# Patient Record
Sex: FEMALE | Race: WHITE | NOT HISPANIC OR LATINO | Employment: OTHER | URBAN - METROPOLITAN AREA
[De-identification: names, ages, dates, MRNs, and addresses within clinical notes are randomized per-mention and may not be internally consistent; named-entity substitution may affect disease eponyms.]

---

## 2017-05-17 ENCOUNTER — HOSPITAL ENCOUNTER (OUTPATIENT)
Dept: RADIOLOGY | Age: 67
Discharge: HOME | End: 2017-05-17
Payer: COMMERCIAL

## 2017-05-17 DIAGNOSIS — Z12.31 ENCOUNTER FOR SCREENING MAMMOGRAM FOR MALIGNANT NEOPLASM OF BREAST: ICD-10-CM

## 2017-05-17 PROCEDURE — G0202 SCR MAMMO BI INCL CAD: HCPCS

## 2017-12-06 ENCOUNTER — TRANSCRIBE ORDERS (OUTPATIENT)
Dept: ADMINISTRATIVE | Age: 67
End: 2017-12-06

## 2017-12-06 ENCOUNTER — APPOINTMENT (OUTPATIENT)
Dept: LAB | Age: 67
End: 2017-12-06
Payer: COMMERCIAL

## 2017-12-06 DIAGNOSIS — Z00.00 ROUTINE GENERAL MEDICAL EXAMINATION AT A HEALTH CARE FACILITY: ICD-10-CM

## 2017-12-06 DIAGNOSIS — E78.2 MIXED HYPERLIPIDEMIA: ICD-10-CM

## 2017-12-06 DIAGNOSIS — Z00.00 ROUTINE GENERAL MEDICAL EXAMINATION AT A HEALTH CARE FACILITY: Primary | ICD-10-CM

## 2017-12-06 LAB
ALBUMIN SERPL BCP-MCNC: 3.9 G/DL (ref 3.5–5)
ALP SERPL-CCNC: 95 U/L (ref 46–116)
ALT SERPL W P-5'-P-CCNC: 24 U/L (ref 12–78)
ANION GAP SERPL CALCULATED.3IONS-SCNC: 5 MMOL/L (ref 4–13)
AST SERPL W P-5'-P-CCNC: 18 U/L (ref 5–45)
BASOPHILS # BLD AUTO: 0.02 THOUSANDS/ΜL (ref 0–0.1)
BASOPHILS NFR BLD AUTO: 0 % (ref 0–1)
BILIRUB SERPL-MCNC: 0.48 MG/DL (ref 0.2–1)
BUN SERPL-MCNC: 19 MG/DL (ref 5–25)
CALCIUM SERPL-MCNC: 9 MG/DL (ref 8.3–10.1)
CHLORIDE SERPL-SCNC: 103 MMOL/L (ref 100–108)
CHOLEST SERPL-MCNC: 237 MG/DL (ref 50–200)
CO2 SERPL-SCNC: 32 MMOL/L (ref 21–32)
CREAT SERPL-MCNC: 0.86 MG/DL (ref 0.6–1.3)
EOSINOPHIL # BLD AUTO: 0.12 THOUSAND/ΜL (ref 0–0.61)
EOSINOPHIL NFR BLD AUTO: 2 % (ref 0–6)
ERYTHROCYTE [DISTWIDTH] IN BLOOD BY AUTOMATED COUNT: 14.1 % (ref 11.6–15.1)
GFR SERPL CREATININE-BSD FRML MDRD: 69 ML/MIN/1.73SQ M
GLUCOSE P FAST SERPL-MCNC: 83 MG/DL (ref 65–99)
HCT VFR BLD AUTO: 37.7 % (ref 34.8–46.1)
HDLC SERPL-MCNC: 54 MG/DL (ref 40–60)
HGB BLD-MCNC: 12.7 G/DL (ref 11.5–15.4)
LDLC SERPL CALC-MCNC: 158 MG/DL (ref 0–100)
LYMPHOCYTES # BLD AUTO: 2.35 THOUSANDS/ΜL (ref 0.6–4.47)
LYMPHOCYTES NFR BLD AUTO: 48 % (ref 14–44)
MCH RBC QN AUTO: 29.4 PG (ref 26.8–34.3)
MCHC RBC AUTO-ENTMCNC: 33.7 G/DL (ref 31.4–37.4)
MCV RBC AUTO: 87 FL (ref 82–98)
MONOCYTES # BLD AUTO: 0.58 THOUSAND/ΜL (ref 0.17–1.22)
MONOCYTES NFR BLD AUTO: 12 % (ref 4–12)
NEUTROPHILS # BLD AUTO: 1.92 THOUSANDS/ΜL (ref 1.85–7.62)
NEUTS SEG NFR BLD AUTO: 38 % (ref 43–75)
NRBC BLD AUTO-RTO: 0 /100 WBCS
PLATELET # BLD AUTO: 410 THOUSANDS/UL (ref 149–390)
PMV BLD AUTO: 11.2 FL (ref 8.9–12.7)
POTASSIUM SERPL-SCNC: 4 MMOL/L (ref 3.5–5.3)
PROT SERPL-MCNC: 7.9 G/DL (ref 6.4–8.2)
RBC # BLD AUTO: 4.32 MILLION/UL (ref 3.81–5.12)
SODIUM SERPL-SCNC: 140 MMOL/L (ref 136–145)
TRIGL SERPL-MCNC: 124 MG/DL
WBC # BLD AUTO: 5 THOUSAND/UL (ref 4.31–10.16)

## 2017-12-06 PROCEDURE — 80061 LIPID PANEL: CPT

## 2017-12-06 PROCEDURE — 80053 COMPREHEN METABOLIC PANEL: CPT

## 2017-12-06 PROCEDURE — 36415 COLL VENOUS BLD VENIPUNCTURE: CPT

## 2017-12-06 PROCEDURE — 85025 COMPLETE CBC W/AUTO DIFF WBC: CPT

## 2018-05-18 ENCOUNTER — TRANSCRIBE ORDERS (OUTPATIENT)
Dept: RADIOLOGY | Facility: CLINIC | Age: 68
End: 2018-05-18

## 2018-05-22 ENCOUNTER — HOSPITAL ENCOUNTER (OUTPATIENT)
Dept: RADIOLOGY | Age: 68
Discharge: HOME | End: 2018-05-22
Payer: COMMERCIAL

## 2018-05-22 DIAGNOSIS — Z12.31 ENCOUNTER FOR SCREENING MAMMOGRAM FOR MALIGNANT NEOPLASM OF BREAST: ICD-10-CM

## 2018-05-22 PROCEDURE — 77067 SCR MAMMO BI INCL CAD: CPT

## 2018-05-25 ENCOUNTER — TELEPHONE (OUTPATIENT)
Dept: MAMMOGRAPHY | Facility: CLINIC | Age: 68
End: 2018-05-25

## 2021-02-13 DIAGNOSIS — Z23 ENCOUNTER FOR IMMUNIZATION: ICD-10-CM

## 2021-02-17 ENCOUNTER — IMMUNIZATIONS (OUTPATIENT)
Dept: FAMILY MEDICINE CLINIC | Facility: HOSPITAL | Age: 71
End: 2021-02-17

## 2021-02-17 DIAGNOSIS — Z23 ENCOUNTER FOR IMMUNIZATION: Primary | ICD-10-CM

## 2021-02-17 PROCEDURE — 0001A SARS-COV-2 / COVID-19 MRNA VACCINE (PFIZER-BIONTECH) 30 MCG: CPT

## 2021-02-17 PROCEDURE — 91300 SARS-COV-2 / COVID-19 MRNA VACCINE (PFIZER-BIONTECH) 30 MCG: CPT

## 2021-03-08 ENCOUNTER — IMMUNIZATIONS (OUTPATIENT)
Dept: FAMILY MEDICINE CLINIC | Facility: HOSPITAL | Age: 71
End: 2021-03-08

## 2021-03-08 DIAGNOSIS — Z23 ENCOUNTER FOR IMMUNIZATION: Primary | ICD-10-CM

## 2021-03-08 PROCEDURE — 91300 SARS-COV-2 / COVID-19 MRNA VACCINE (PFIZER-BIONTECH) 30 MCG: CPT

## 2021-03-08 PROCEDURE — 0002A SARS-COV-2 / COVID-19 MRNA VACCINE (PFIZER-BIONTECH) 30 MCG: CPT

## 2023-03-01 ENCOUNTER — EVALUATION (OUTPATIENT)
Dept: PHYSICAL THERAPY | Facility: CLINIC | Age: 73
End: 2023-03-01

## 2023-03-01 DIAGNOSIS — M72.2 PLANTAR FASCIITIS OF LEFT FOOT: Primary | ICD-10-CM

## 2023-03-01 DIAGNOSIS — M79.672 PAIN OF LEFT HEEL: ICD-10-CM

## 2023-03-01 NOTE — PROGRESS NOTES
PT Evaluation     Today's date: 3/1/2023  Patient name: Khloe Faria  : 1950  MRN: [id number]  Referring provider: Chris Chandra DPM  Dx:   Encounter Diagnosis     ICD-10-CM    1  Plantar fasciitis of left foot  M72 2       2  Pain of left heel  M79 672           Start Time: 1400  Stop Time: 1445  Total time in clinic (min): 45 minutes    Assessment  Assessment details: Problem List:  1) Decreased talocrural mobility   -Addressing with joint mobs, flexibility exercises    2) Abnormal gait function   -Addressing with neuro re-ed, gait re-training      Khloe Faria is a pleasant 74 y o  female who presents with complaints of ongoing left heel pain involving decreased talocrural mobility and altered gait mechanics and resulting in signs and symptoms consistent with a diagnosis of plantar heel pain  These findings lead to the pain she is experiencing, worry over not knowing what's wrong, concern at no signs of improvement, wanting to avoid surgery, fear of not being able to keep active and future ill health (and wanting to prevent it)  No further referral appears necessary at this time based upon examination results  I expect she will respond well to conservative care  Positive prognostic indicators include positive attitude toward recovery, good understanding of diagnosis and treatment plan options, acuity of symptoms, absence of peripheralization and absence of observed red flags  Negative prognostic indicators include high symptom irritability and multiple concurrent orthopedic problems  Comparable signs:  1) Standing with less pain    2) Walking with less pain    Goals  Patient will be independent with home exercise program    Patient will be able to manage symptoms independently       Short Term Goals: to be achieved by 4 weeks  1) Patient to be independent with basic HEP  2) Improve all limited foot and ankle joint mobility limitations to WNL  3) Improve the patients pain to less than 4/10 at worst  4) Increase LE strength by 1/2 MMT grade in all deficient planes  5) Patient will be able to demonstrate return to full weightbearing without limitation, as appropriate    Long Term Goals: to be achieved by discharge  1) FOTO equal to or greater than 57  2) Patient to be independent with comprehensive HEP  3) Increase LE strength to 5/5 MMT grade in all planes to improve a/iadls  4) Patient will demonstrate increase tolerance for ambulation to >30 min  5) Improve stair negotiation to maximal level of function  6) Patient will be able to return to pre-morbid levels of activity      Plan  Planned therapy interventions: manual therapy, neuromuscular re-education, therapeutic activities, therapeutic exercise, self care and home exercise program  Frequency: 2x week  Duration in weeks: 8  Treatment plan discussed with: patient        Subjective Evaluation    History of Present Illness  Mechanism of injury: History of Current Injury: The patient reports a 1 month history of left heel pain, no known origin, was referred to PT by her podiatrist but has also been managed by her PCP  The patient has received multiple injections into her heel as well as adjusted her footwear, but minimal benefit  Surgery date: N/A  Pain location/Descriptors: The patient notes left heel pain in the medial and plantar aspect, notes a sharper pain "like you stepped on a lego"  Aggravating factors: Pain with standing/walking/stairs, etc   Easing factors: Sitting relieves pain  24 HR pattern: Does not affect sleep, hurts upon weightbearing   Imaging: XR, bone spur noted  Special Questions: Denies numbness/tingling, no weakness  Patient concerns: Being able to walk without pain, likes to walk outside   Also wants to be able to perform ADLs (cooking/cleaning)  Occupation: Retired (former  at [employer])      [address] in house: yes   Lives in: multiple-level home  Lives with: spouse    Employment status: not working        Objective     Neurological Testing     Sensation     Ankle/Foot   Left Ankle/Foot   Intact: light touch    Right Ankle/Foot   Intact: light touch     Active Range of Motion   Left Ankle/Foot   Dorsiflexion (ke): 4 degrees with pain  Plantar flexion: 62 degrees   Inversion: 42 degrees   Eversion: 40 degrees     Right Ankle/Foot   Dorsiflexion (ke): 12 degrees   Plantar flexion: 60 degrees   Inversion: 40 degrees   Eversion: 45 degrees     Joint Play   Left Ankle/Foot  Joints within functional limits are the proximal tibiofibular joint and distal tibiofibular joint  Hypomobile in the talocrural joint, subtalar joint and midfoot  Right Ankle/Foot  Joints within functional limits are the proximal tibiofibular joint, distal tibiofibular joint, talocrural joint, subtalar joint and midfoot       Strength/Myotome Testing     Left Ankle/Foot   Normal strength    Right Ankle/Foot   Normal strength    Ambulation     Comments   Observation of level surface gait without footwear reveals increased external rotation with pronation on the left foot which is consistent with noted decrease in dorsiflexion and general talocrural mobility             Precautions: Standard      Manuals 3/1/23            Talocrural joint mobs (gr II-III)             Subtalar joint mobs (gr II-III)             Midfoot mobs (gr II-III)                          Neuro Re-Ed             Short foot             Towel curl             BAPS                                                                 Ther Ex             Active warmup             Gastroc/soleus stretch HEP            Ankle 4 ways              Toe flexion             Heel raise             Squat                                       Ther Activity             Single leg balance                                                                 Gait Training             Step-ups                                                                 Assessment IE Education Prognosis, HEP, POC

## 2023-03-01 NOTE — LETTER
2023    Delmar Lieberman DPM  [address] [id number]    Patient: Laureen Banuelos   YOB: 1950   Date of Visit: 3/1/2023     Encounter Diagnosis     ICD-10-CM    1  Plantar fasciitis of left foot  M72 2       2  Pain of left heel  M79 672           Dear Dr Mellisa Vasquez: Thank you for your recent referral of Laureen Banuelos  Please review the attached evaluation summary from Kathrin's recent visit  Please verify that you agree with the plan of care by signing the attached order  If you have any questions or concerns, please do not hesitate to call  I sincerely appreciate the opportunity to share in the care of one of your patients and hope to have another opportunity to work with you in the near future  Sincerely,    Tyler Chavez, PT      Referring Provider:      I certify that I have read the below Plan of Care and certify the need for these services furnished under this plan of treatment while under my care  Delmar Lieberman DPM  [address] [address]  Via Fax: [phone]          PT Evaluation     Today's date: 3/1/2023  Patient name: Laureen Banuelos  : 1950  MRN: [id number]  Referring provider: Mara Mohamud DPM  Dx:   Encounter Diagnosis     ICD-10-CM    1  Plantar fasciitis of left foot  M72 2       2  Pain of left heel  M79 672           Start Time: 1400  Stop Time: 1445  Total time in clinic (min): 45 minutes    Assessment  Assessment details: Problem List:  1) Decreased talocrural mobility   -Addressing with joint mobs, flexibility exercises    2) Abnormal gait function   -Addressing with neuro re-ed, gait re-training      Laureen Banuelos is a pleasant 74 y o  female who presents with complaints of ongoing left heel pain involving decreased talocrural mobility and altered gait mechanics and resulting in signs and symptoms consistent with a diagnosis of plantar heel pain   These findings lead to the pain she is experiencing, worry over not knowing what's wrong, concern at no signs of improvement, wanting to avoid surgery, fear of not being able to keep active and future ill health (and wanting to prevent it)  No further referral appears necessary at this time based upon examination results  I expect she will respond well to conservative care  Positive prognostic indicators include positive attitude toward recovery, good understanding of diagnosis and treatment plan options, acuity of symptoms, absence of peripheralization and absence of observed red flags  Negative prognostic indicators include high symptom irritability and multiple concurrent orthopedic problems  Comparable signs:  1) Standing with less pain    2) Walking with less pain    Goals  Patient will be independent with home exercise program    Patient will be able to manage symptoms independently  Short Term Goals: to be achieved by 4 weeks  1) Patient to be independent with basic HEP  2) Improve all limited foot and ankle joint mobility limitations to WNL  3) Improve the patients pain to less than 4/10 at worst  4) Increase LE strength by 1/2 MMT grade in all deficient planes  5) Patient will be able to demonstrate return to full weightbearing without limitation, as appropriate    Long Term Goals: to be achieved by discharge  1) FOTO equal to or greater than 57  2) Patient to be independent with comprehensive HEP  3) Increase LE strength to 5/5 MMT grade in all planes to improve a/iadls    4) Patient will demonstrate increase tolerance for ambulation to >30 min  5) Improve stair negotiation to maximal level of function  6) Patient will be able to return to pre-morbid levels of activity      Plan  Planned therapy interventions: manual therapy, neuromuscular re-education, therapeutic activities, therapeutic exercise, self care and home exercise program  Frequency: 2x week  Duration in weeks: 8  Treatment plan discussed with: patient        Subjective Evaluation    History of Present Illness  Mechanism of injury: History of Current Injury: The patient reports a 1 month history of left heel pain, no known origin, was referred to PT by her podiatrist but has also been managed by her PCP  The patient has received multiple injections into her heel as well as adjusted her footwear, but minimal benefit  Surgery date: N/A  Pain location/Descriptors: The patient notes left heel pain in the medial and plantar aspect, notes a sharper pain "like you stepped on a lego"  Aggravating factors: Pain with standing/walking/stairs, etc   Easing factors: Sitting relieves pain  24 HR pattern: Does not affect sleep, hurts upon weightbearing   Imaging: XR, bone spur noted  Special Questions: Denies numbness/tingling, no weakness  Patient concerns: Being able to walk without pain, likes to walk outside  Also wants to be able to perform ADLs (cooking/cleaning)  Occupation: Retired (former  at a [employer])      [address] in house: yes   Lives in: multiple-level home  Lives with: spouse    Employment status: not working        Objective     Neurological Testing     Sensation     Ankle/Foot   Left Ankle/Foot   Intact: light touch    Right Ankle/Foot   Intact: light touch     Active Range of Motion   Left Ankle/Foot   Dorsiflexion (ke): 4 degrees with pain  Plantar flexion: 62 degrees   Inversion: 42 degrees   Eversion: 40 degrees     Right Ankle/Foot   Dorsiflexion (ke): 12 degrees   Plantar flexion: 60 degrees   Inversion: 40 degrees   Eversion: 45 degrees     Joint Play   Left Ankle/Foot  Joints within functional limits are the proximal tibiofibular joint and distal tibiofibular joint  Hypomobile in the talocrural joint, subtalar joint and midfoot  Right Ankle/Foot  Joints within functional limits are the proximal tibiofibular joint, distal tibiofibular joint, talocrural joint, subtalar joint and midfoot  Strength/Myotome Testing     Left Ankle/Foot   Normal strength    Right Ankle/Foot   Normal strength    Ambulation     Comments   Observation of level surface gait without footwear reveals increased external rotation with pronation on the left foot which is consistent with noted decrease in dorsiflexion and general talocrural mobility            Precautions: Standard      Manuals 3/1/23            Talocrural joint mobs (gr II-III)             Subtalar joint mobs (gr II-III)             Midfoot mobs (gr II-III)                          Neuro Re-Ed             Short foot             Towel curl             BAPS                                                                 Ther Ex             Active warmup             Gastroc/soleus stretch HEP            Ankle 4 ways              Toe flexion             Heel raise             Squat                                       Ther Activity             Single leg balance                                                                 Gait Training             Step-ups                                                                 Assessment IE            Education Prognosis, HEP, POC

## 2023-03-06 ENCOUNTER — OFFICE VISIT (OUTPATIENT)
Dept: PHYSICAL THERAPY | Facility: CLINIC | Age: 73
End: 2023-03-06

## 2023-03-06 DIAGNOSIS — M79.672 PAIN OF LEFT HEEL: ICD-10-CM

## 2023-03-06 DIAGNOSIS — M72.2 PLANTAR FASCIITIS OF LEFT FOOT: Primary | ICD-10-CM

## 2023-03-09 ENCOUNTER — OFFICE VISIT (OUTPATIENT)
Dept: PHYSICAL THERAPY | Facility: CLINIC | Age: 73
End: 2023-03-09

## 2023-03-09 DIAGNOSIS — M72.2 PLANTAR FASCIITIS OF LEFT FOOT: Primary | ICD-10-CM

## 2023-03-09 DIAGNOSIS — M79.672 PAIN OF LEFT HEEL: ICD-10-CM

## 2023-03-09 NOTE — PROGRESS NOTES
Daily Note     Today's date: 3/9/2023  Patient name: Denisa Alexander  : 1950  MRN: [id number]  Referring provider: Jennifer Moore DPM  Dx:   Encounter Diagnosis     ICD-10-CM    1  Plantar fasciitis of left foot  M72 2       2  Pain of left heel  M79 672           Start Time: 1425  Stop Time: 1510  Total time in clinic (min): 45 minutes    Subjective: No new complaints today  The patient reports slight improvement in symptoms since previous session, although she notes that immediately after her last session she was in more pain  Objective: See treatment diary below      Assessment: The PT continued to progress care during today's session  Foot intrinsic training in the form of short foot and towel curls were added to the patient's current program to promote an improvement in general tolerance for weightbearing activity  The patient tolerated manual and active treatment well today  The patient would benefit from further skilled PT services  Plan: Continue per plan of care        Precautions: Standard      Manuals 3/1/23 3/6 3/9          Talocrural joint mobs (gr II-III)  SRB SRB          Subtalar joint mobs (gr II-III)  SRB SRB          Midfoot mobs (gr II-III)             IASTM to heel  SRB SRB                                                 Neuro Re-Ed             Short foot   x5 min          Towel curl   x3 min          BAPS                                                                 Ther Ex             Active warmup   Recumb bike x6 min          Gastroc/soleus stretch HEP Review slantboard 5 sec x10          Ankle 4 ways   GTB PF/inv/ev x30 ea, HEP GTB PF/inv/ev x30 ea          Toe flexion             Heel raise  Standing, DL x30 HEP Off edge of step, 3x10          Squat                                       Ther Activity             Single leg balance                                                                 Gait Training             Step-ups Assessment IE            Education Prognosis, HEP, POC

## 2023-03-13 ENCOUNTER — OFFICE VISIT (OUTPATIENT)
Dept: PHYSICAL THERAPY | Facility: CLINIC | Age: 73
End: 2023-03-13

## 2023-03-13 DIAGNOSIS — M72.2 PLANTAR FASCIITIS OF LEFT FOOT: Primary | ICD-10-CM

## 2023-03-13 DIAGNOSIS — M79.672 PAIN OF LEFT HEEL: ICD-10-CM

## 2023-03-13 NOTE — PROGRESS NOTES
Daily Note     Today's date: 3/13/2023  Patient name: Gaye Henderson  : 1950  MRN: [id number]  Referring provider: Lulú Her DPM  Dx:   Encounter Diagnosis     ICD-10-CM    1  Plantar fasciitis of left foot  M72 2       2  Pain of left heel  M79 672           Start Time: 1500  Stop Time: 1545  Total time in clinic (min): 45 minutes    Subjective: No new complaints today  The patient reports improvement in symptoms since previous session  Objective: See treatment diary below      Assessment: The PT continued to focus on controlling foot/ankle symptoms during today's session  Harris pickup and BAPS were both added to the patient's current program to promote various neuromuscular fascilitation  The patient tolerated manual and active treatment well today  The patient would benefit from further skilled PT services  Plan: Continue per plan of care        Precautions: Standard      Manuals 3/1/23 3/6 3/9 3/13         Talocrural joint mobs (gr II-III)  SRB SRB SRB         Subtalar joint mobs (gr II-III)  SRB SRB SRB         Midfoot mobs (gr II-III)             IASTM to heel  SRB SRB SRB                                                Neuro Re-Ed             Short foot   x5 min x5 min         Towel curl   x3 min          BAPS    fw/bw, R/L, CW/CCW x20 min         Marbles    x4 min                                                Ther Ex             Active warmup   Recumb bike x6 min Recumb bike x6 min         Gastroc/soleus stretch HEP Review slantboard 5 sec x10 W/ strap x10         Ankle 4 ways   GTB PF/inv/ev x30 ea, HEP GTB PF/inv/ev x30 ea          Toe flexion             Heel raise  Standing, DL x30 HEP Off edge of step, 3x10 Off edge of step, 3x10         Squat    STS w/ short foot 3x10                                   Ther Activity             Single leg balance                                                                 Gait Training             Step-ups Assessment IE            Education Prognosis, HEP, POC

## 2023-03-15 ENCOUNTER — OFFICE VISIT (OUTPATIENT)
Dept: PHYSICAL THERAPY | Facility: CLINIC | Age: 73
End: 2023-03-15

## 2023-03-15 DIAGNOSIS — M72.2 PLANTAR FASCIITIS OF LEFT FOOT: Primary | ICD-10-CM

## 2023-03-15 DIAGNOSIS — M79.672 PAIN OF LEFT HEEL: ICD-10-CM

## 2023-03-15 NOTE — PROGRESS NOTES
Daily Note     Today's date: 3/15/2023  Patient name: Khloe Faria  : 1950  MRN: [id number]  Referring provider: Chris Chandra DPM  Dx:   Encounter Diagnosis     ICD-10-CM    1  Plantar fasciitis of left foot  M72 2       2  Pain of left heel  M79 672           Start Time: 1215  Stop Time: 1300  Total time in clinic (min): 45 minutes    Subjective: No new complaints today  The patient reports improvement in symptoms since previous session  Objective: See treatment diary below      Assessment: The PT continued with the current program during today's session  Leg press heel raises were added to the patient's current program to promote more loading at a tolerable level  The patient tolerated manual and active treatment well today  The patient would benefit from further skilled PT services  Plan: Continue per plan of care        Precautions: Standard      Manuals 3/1/23 3/6 3/9 3/13 3/15        Talocrural joint mobs (gr II-III)  SRB SRB SRB SRB        Subtalar joint mobs (gr II-III)  SRB SRB SRB SRB        Midfoot mobs (gr II-III)             IASTM to heel  SRB SRB SRB SRB                                               Neuro Re-Ed             Short foot   x5 min x5 min x3 min        Towel curl   x3 min          BAPS    fw/bw, R/L, CW/CCW x20 min         Marbles    x4 min x4 min                                               Ther Ex             Active warmup   Recumb bike x6 min Recumb bike x6 min Recumb bike x6 min        Gastroc/soleus stretch HEP Review slantboard 5 sec x10 W/ strap x10 At edge of step 10 sec x10        Ankle 4 ways   GTB PF/inv/ev x30 ea, HEP GTB PF/inv/ev x30 ea          Toe flexion             Heel raise  Standing, DL x30 HEP Off edge of step, 3x10 Off edge of step, 3x10 On leg press 50# 3x10        Squat    STS w/ short foot 3x10                                   Ther Activity             Single leg balance                                                                 Gait Training             Step-ups             Toe-off     At step in [place], 2x10                                               Assessment IE            Education Prognosis, HEP, POC

## 2023-03-20 ENCOUNTER — OFFICE VISIT (OUTPATIENT)
Dept: PHYSICAL THERAPY | Facility: CLINIC | Age: 73
End: 2023-03-20

## 2023-03-20 DIAGNOSIS — M79.672 PAIN OF LEFT HEEL: ICD-10-CM

## 2023-03-20 DIAGNOSIS — M72.2 PLANTAR FASCIITIS OF LEFT FOOT: Primary | ICD-10-CM

## 2023-03-20 NOTE — PROGRESS NOTES
Daily Note     Today's date: 3/20/2023  Patient name: Alfonso Argueta  : 1950  MRN: [id number]  Referring provider: Alayna Sharma DPM  Dx:   Encounter Diagnosis     ICD-10-CM    1  Plantar fasciitis of left foot  M72 2       2  Pain of left heel  M79 672           Start Time: 1400  Stop Time: 1445  Total time in clinic (min): 45 minutes    Subjective: No new complaints today  The patient reports an increase in symptoms since previous session  Objective: See treatment diary below      Assessment: The PT spent time evaluating the potential for a spinal component to the patient's pain presentation during today's session, but findings continue to suggest a local structure as the primary symptom   Soleus strengthening was added to the patient's current program to promote improved posterior chain health  The patient tolerated manual and active treatment well today  The patient would benefit from further skilled PT services  Plan: Continue per plan of care        Precautions: Standard      Manuals 3/1/23 3 3/9 3/13 3/15 3/20       Talocrural joint mobs (gr II-III)  SRB SRB SRB SRB SRB       Subtalar joint mobs (gr II-III)  SRB SRB SRB SRB SRB       Midfoot mobs (gr II-III)             IASTM to heel  SRB SRB SRB SRB SRB                                              Neuro Re-Ed             Short foot   x5 min x5 min x3 min        Towel curl   x3 min          BAPS    fw/bw, R/L, CW/CCW x20 min         Marbles    x4 min x4 min                                               Ther Ex             Active warmup   Recumb bike x6 min Recumb bike x6 min Recumb bike x6 min Recumb bike x6 min       Gastroc/soleus stretch HEP Review slantboard 5 sec x10 W/ strap x10 At edge of step 10 sec x10 W/ strap 10 sec x10       Prostretch      3 sec x25       Ankle 4 ways   GTB PF/inv/ev x30 ea, HEP GTB PF/inv/ev x30 ea          Toe flexion             Heel raise  Standing, DL x30 HEP Off edge of step, 3x10 Off edge of step, 3x10 On leg press 50# 3x10 On leg press 50# 3x15       Soleus heel raise      seated blue kb x45       Squat    STS w/ short foot 3x10                                   Ther Activity             Single leg balance                                                                 Gait Training             Step-ups             Toe-off     At step in lunge, 2x10                                               Assessment IE            Education Prognosis, HEP, POC

## 2023-03-23 ENCOUNTER — OFFICE VISIT (OUTPATIENT)
Dept: PHYSICAL THERAPY | Facility: CLINIC | Age: 73
End: 2023-03-23

## 2023-03-23 DIAGNOSIS — M79.672 PAIN OF LEFT HEEL: ICD-10-CM

## 2023-03-23 DIAGNOSIS — M72.2 PLANTAR FASCIITIS OF LEFT FOOT: Primary | ICD-10-CM

## 2023-03-23 NOTE — PROGRESS NOTES
Daily Note     Today's date: 3/23/2023  Patient name: Arleth Maguire  : 1950  MRN: [id number]  Referring provider: Alon Hernandez DPM  Dx:   Encounter Diagnosis     ICD-10-CM    1  Plantar fasciitis of left foot  M72 2       2  Pain of left heel  M79 672           Start Time: 1400  Stop Time: 1445  Total time in clinic (min): 45 minutes    Subjective: No new complaints today  The patient reports no change in symptoms since previous session, reports that she went to see her podiatrist who recommended she pursue a surgical consultation  Objective: See treatment diary below      Assessment: The PT focused on fundamentals during today's session  Heel raises were performed on mini tramp to promote improved tolerance to full loading  The patient tolerated manual and active treatment well today  The patient would benefit from further skilled PT services  Plan: Continue per plan of care        Precautions: Standard      Manuals 3/1/23 3/6 3/9 3/13 3/15 3/20 3/23      Talocrural joint mobs (gr II-III)  SRB SRB SRB SRB SRB SRB      Subtalar joint mobs (gr II-III)  SRB SRB SRB SRB SRB SRB      Midfoot mobs (gr II-III)             IASTM to heel  SRB SRB SRB SRB SRB SRB                                             Neuro Re-Ed             Short foot   x5 min x5 min x3 min        Towel curl   x3 min          BAPS    fw/bw, R/L, CW/CCW x20 min         Marbles    x4 min x4 min                                               Ther Ex             Active warmup   Recumb bike x6 min Recumb bike x6 min Recumb bike x6 min Recumb bike x6 min Recumb bike x6 min      Gastroc/soleus stretch HEP Review slantboard 5 sec x10 W/ strap x10 At edge of step 10 sec x10 W/ strap 10 sec x10 W/ strap 10 sec x10      Prostretch      3 sec x25       Ankle 4 ways   GTB PF/inv/ev x30 ea, HEP GTB PF/inv/ev x30 ea          Toe flexion             Heel raise  Standing, DL x30 HEP Off edge of step, 3x10 Off edge of step, 3x10 On leg press 50# 3x10 On leg press 50# 3x15 On leg press 50# 3x15      Heel raises       On mini tramp x45      Soleus heel raise      seated blue kb x45 seated blue kb x45      Squat    STS w/ short foot 3x10                                   Ther Activity             Single leg balance                                                                 Gait Training             Step-ups             Toe-off     At step in lunge, 2x10                                               Assessment IE            Education Prognosis, HEP, POC